# Patient Record
Sex: FEMALE | Race: WHITE | Employment: OTHER | URBAN - METROPOLITAN AREA
[De-identification: names, ages, dates, MRNs, and addresses within clinical notes are randomized per-mention and may not be internally consistent; named-entity substitution may affect disease eponyms.]

---

## 2017-01-19 DIAGNOSIS — N39.3 URINARY, INCONTINENCE, STRESS FEMALE: ICD-10-CM

## 2017-01-19 DIAGNOSIS — E87.6 HYPOKALEMIA: Primary | ICD-10-CM

## 2017-01-19 NOTE — TELEPHONE ENCOUNTER
Potassium Chloride SA      Last Written Prescription Date: 04/09/2015  Last Fill Quantity: 270, # refills: 4  Last Office Visit with [facility], [facility] or [facility] prescribing provider: 10/17/2016       POTASSIUM   Date Value Ref Range Status   02/23/2015 4.5 3.4 - 5.3 mmol/L Final     CREATININE   Date Value Ref Range Status   02/23/2015 0.98 0.52 - 1.04 mg/dL Final     BP Readings from Last 3 Encounters:   10/17/16 118/68   04/01/16 134/63   03/29/16 142/76     Oxybutynin 10mg      Last Written Prescription Date: 02/09/2015  Last Fill Quantity: 90,  # refills: 3  Last Office Visit with [facility], [facility] or [facility] prescribing provider: 10/17/2016                                               Jaquelin Gaines MA

## 2017-01-24 RX ORDER — OXYBUTYNIN CHLORIDE 10 MG/1
10 TABLET, EXTENDED RELEASE ORAL DAILY
Qty: 90 TABLET | Refills: 4 | Status: SHIPPED | OUTPATIENT
Start: 2017-01-24 | End: 2019-01-01

## 2017-01-24 RX ORDER — POTASSIUM CHLORIDE 1500 MG/1
60 TABLET, EXTENDED RELEASE ORAL DAILY
Qty: 270 TABLET | Refills: 4 | Status: SHIPPED | OUTPATIENT
Start: 2017-01-24 | End: 2019-01-01

## 2017-01-24 NOTE — TELEPHONE ENCOUNTER
Routing refill request to provider for review/approval because:  Oxybutynin- was prescribed by urology-will pcp fill this?  Potassium- patient due for labs next month    Page Lewis RN

## 2017-01-30 DIAGNOSIS — R19.7 DIARRHEA, UNSPECIFIED TYPE: Primary | ICD-10-CM

## 2017-01-30 NOTE — TELEPHONE ENCOUNTER
loperamide (IMODIUM A-D) 2 MG tablet      Last Written Prescription Date: 11/9/2016  Last Fill Quantity: 30,  # refills: 0   Last Office Visit with [facility], [facility] or [facility] prescribing provider: 10/17/2016

## 2017-02-02 DIAGNOSIS — R60.9 EDEMA, UNSPECIFIED TYPE: Primary | ICD-10-CM

## 2017-02-02 RX ORDER — LOPERAMIDE HYDROCHLORIDE 2 MG/1
TABLET ORAL
Qty: 30 TABLET | Refills: 0 | Status: SHIPPED | OUTPATIENT
Start: 2017-02-02 | End: 2019-01-01

## 2017-02-02 NOTE — TELEPHONE ENCOUNTER
Routing refill request to provider for review/approval because:  Marcela given x1 and patient did not follow up, please advise  Patient needs to be seen because it has been more than 1 year since last office visit.    David BEE RN, BSN

## 2017-02-07 NOTE — TELEPHONE ENCOUNTER
Furosemide 40mg      Last Written Prescription Date: 01/12/17  Last Fill Quantity: 30, # refills: 0  Last Office Visit with [facility], [facility] or [facility] prescribing provider: 4/9/15       POTASSIUM   Date Value Ref Range Status   02/23/2015 4.5 3.4 - 5.3 mmol/L Final     CREATININE   Date Value Ref Range Status   02/23/2015 0.98 0.52 - 1.04 mg/dL Final     BP Readings from Last 3 Encounters:   10/17/16 118/68   04/01/16 134/63   03/29/16 142/76

## 2017-02-09 RX ORDER — FUROSEMIDE 40 MG
40 TABLET ORAL DAILY
Qty: 90 TABLET | Refills: 0 | Status: SHIPPED | OUTPATIENT
Start: 2017-02-09 | End: 2019-01-01

## 2018-01-01 ENCOUNTER — RECORDS - HEALTHEAST (OUTPATIENT)
Dept: LAB | Facility: CLINIC | Age: 83
End: 2018-01-01

## 2018-01-01 LAB
CHOLEST SERPL-MCNC: 160 MG/DL
FASTING STATUS PATIENT QL REPORTED: YES
HDLC SERPL-MCNC: 42 MG/DL
LDLC SERPL CALC-MCNC: 97 MG/DL
TRIGL SERPL-MCNC: 104 MG/DL

## 2018-01-29 ENCOUNTER — RECORDS - HEALTHEAST (OUTPATIENT)
Dept: LAB | Facility: CLINIC | Age: 83
End: 2018-01-29

## 2018-01-29 LAB
CHOLEST SERPL-MCNC: 177 MG/DL
FASTING STATUS PATIENT QL REPORTED: ABNORMAL
HDLC SERPL-MCNC: 42 MG/DL
LDLC SERPL CALC-MCNC: 116 MG/DL
TRIGL SERPL-MCNC: 97 MG/DL

## 2018-06-26 ENCOUNTER — RECORDS - HEALTHEAST (OUTPATIENT)
Dept: LAB | Facility: CLINIC | Age: 83
End: 2018-06-26

## 2018-06-26 LAB
ANION GAP SERPL CALCULATED.3IONS-SCNC: 12 MMOL/L (ref 5–18)
BUN SERPL-MCNC: 19 MG/DL (ref 8–28)
CALCIUM SERPL-MCNC: 8.7 MG/DL (ref 8.5–10.5)
CHLORIDE BLD-SCNC: 109 MMOL/L (ref 98–107)
CO2 SERPL-SCNC: 20 MMOL/L (ref 22–31)
CREAT SERPL-MCNC: 0.75 MG/DL (ref 0.6–1.1)
ERYTHROCYTE [DISTWIDTH] IN BLOOD BY AUTOMATED COUNT: 13.9 % (ref 11–14.5)
GFR SERPL CREATININE-BSD FRML MDRD: >60 ML/MIN/1.73M2
GLUCOSE BLD-MCNC: 78 MG/DL (ref 70–125)
HBA1C MFR BLD: 4.9 % (ref 4.2–6.1)
HCT VFR BLD AUTO: 33.4 % (ref 35–47)
HGB BLD-MCNC: 10.4 G/DL (ref 12–16)
MCH RBC QN AUTO: 29.5 PG (ref 27–34)
MCHC RBC AUTO-ENTMCNC: 31.1 G/DL (ref 32–36)
MCV RBC AUTO: 95 FL (ref 80–100)
PLATELET # BLD AUTO: 387 THOU/UL (ref 140–440)
PMV BLD AUTO: 9.6 FL (ref 8.5–12.5)
POTASSIUM BLD-SCNC: 4.6 MMOL/L (ref 3.5–5)
RBC # BLD AUTO: 3.52 MILL/UL (ref 3.8–5.4)
SODIUM SERPL-SCNC: 141 MMOL/L (ref 136–145)
TSH SERPL DL<=0.005 MIU/L-ACNC: 2.7 UIU/ML (ref 0.3–5)
WBC: 6.1 THOU/UL (ref 4–11)

## 2019-01-01 ENCOUNTER — RECORDS - HEALTHEAST (OUTPATIENT)
Dept: LAB | Facility: CLINIC | Age: 84
End: 2019-01-01

## 2019-01-01 ENCOUNTER — TRANSFERRED RECORDS (OUTPATIENT)
Dept: HEALTH INFORMATION MANAGEMENT | Facility: CLINIC | Age: 84
End: 2019-01-01

## 2019-01-01 ENCOUNTER — NURSING HOME VISIT (OUTPATIENT)
Dept: GERIATRICS | Facility: CLINIC | Age: 84
End: 2019-01-01
Payer: COMMERCIAL

## 2019-01-01 VITALS
DIASTOLIC BLOOD PRESSURE: 76 MMHG | HEIGHT: 64 IN | SYSTOLIC BLOOD PRESSURE: 128 MMHG | WEIGHT: 165.7 LBS | HEART RATE: 76 BPM | TEMPERATURE: 98.4 F | BODY MASS INDEX: 28.29 KG/M2 | OXYGEN SATURATION: 96 % | RESPIRATION RATE: 18 BRPM

## 2019-01-01 VITALS
WEIGHT: 160.6 LBS | RESPIRATION RATE: 17 BRPM | TEMPERATURE: 97.8 F | BODY MASS INDEX: 27.57 KG/M2 | SYSTOLIC BLOOD PRESSURE: 106 MMHG | DIASTOLIC BLOOD PRESSURE: 66 MMHG | OXYGEN SATURATION: 95 % | HEART RATE: 76 BPM

## 2019-01-01 VITALS
HEART RATE: 70 BPM | BODY MASS INDEX: 27.3 KG/M2 | RESPIRATION RATE: 18 BRPM | WEIGHT: 159.9 LBS | HEIGHT: 64 IN | OXYGEN SATURATION: 96 % | DIASTOLIC BLOOD PRESSURE: 70 MMHG | SYSTOLIC BLOOD PRESSURE: 135 MMHG | TEMPERATURE: 98.6 F

## 2019-01-01 VITALS
HEIGHT: 64 IN | OXYGEN SATURATION: 93 % | BODY MASS INDEX: 28 KG/M2 | DIASTOLIC BLOOD PRESSURE: 69 MMHG | RESPIRATION RATE: 19 BRPM | WEIGHT: 164 LBS | HEART RATE: 82 BPM | TEMPERATURE: 97.2 F | SYSTOLIC BLOOD PRESSURE: 112 MMHG

## 2019-01-01 VITALS
OXYGEN SATURATION: 96 % | SYSTOLIC BLOOD PRESSURE: 127 MMHG | RESPIRATION RATE: 18 BRPM | DIASTOLIC BLOOD PRESSURE: 71 MMHG | BODY MASS INDEX: 26.56 KG/M2 | TEMPERATURE: 97.5 F | HEIGHT: 64 IN | WEIGHT: 155.6 LBS | HEART RATE: 69 BPM

## 2019-01-01 VITALS
HEART RATE: 72 BPM | HEIGHT: 64 IN | TEMPERATURE: 97.5 F | DIASTOLIC BLOOD PRESSURE: 78 MMHG | BODY MASS INDEX: 28.17 KG/M2 | OXYGEN SATURATION: 97 % | WEIGHT: 165 LBS | SYSTOLIC BLOOD PRESSURE: 129 MMHG | RESPIRATION RATE: 17 BRPM

## 2019-01-01 DIAGNOSIS — G89.29 OTHER CHRONIC PAIN: ICD-10-CM

## 2019-01-01 DIAGNOSIS — F03.90 DEMENTIA WITHOUT BEHAVIORAL DISTURBANCE, UNSPECIFIED DEMENTIA TYPE: Primary | ICD-10-CM

## 2019-01-01 DIAGNOSIS — R13.10 DYSPHAGIA, UNSPECIFIED TYPE: ICD-10-CM

## 2019-01-01 DIAGNOSIS — G30.8 ALZHEIMER'S DISEASE OF OTHER ONSET WITHOUT BEHAVIORAL DISTURBANCE: Primary | ICD-10-CM

## 2019-01-01 DIAGNOSIS — I48.0 PAROXYSMAL ATRIAL FIBRILLATION (H): ICD-10-CM

## 2019-01-01 DIAGNOSIS — F03.91 DEMENTIA WITH BEHAVIORAL DISTURBANCE, UNSPECIFIED DEMENTIA TYPE: ICD-10-CM

## 2019-01-01 DIAGNOSIS — F32.9 MAJOR DEPRESSIVE DISORDER, REMISSION STATUS UNSPECIFIED, UNSPECIFIED WHETHER RECURRENT: ICD-10-CM

## 2019-01-01 DIAGNOSIS — F03.91 DEMENTIA WITH BEHAVIORAL DISTURBANCE, UNSPECIFIED DEMENTIA TYPE: Primary | ICD-10-CM

## 2019-01-01 DIAGNOSIS — L89.220 PRESSURE INJURY OF TROCHANTERIC REGION OF LEFT HIP, UNSTAGEABLE (H): ICD-10-CM

## 2019-01-01 DIAGNOSIS — G89.4 CHRONIC PAIN SYNDROME: ICD-10-CM

## 2019-01-01 DIAGNOSIS — R52 PAIN: Primary | ICD-10-CM

## 2019-01-01 DIAGNOSIS — M25.561 PAIN IN BOTH KNEES, UNSPECIFIED CHRONICITY: ICD-10-CM

## 2019-01-01 DIAGNOSIS — I48.91 ATRIAL FIBRILLATION, UNSPECIFIED TYPE (H): ICD-10-CM

## 2019-01-01 DIAGNOSIS — M25.562 PAIN IN BOTH KNEES, UNSPECIFIED CHRONICITY: ICD-10-CM

## 2019-01-01 DIAGNOSIS — F32.1 CURRENT MODERATE EPISODE OF MAJOR DEPRESSIVE DISORDER, UNSPECIFIED WHETHER RECURRENT (H): ICD-10-CM

## 2019-01-01 DIAGNOSIS — F41.8 DEPRESSION WITH ANXIETY: Primary | ICD-10-CM

## 2019-01-01 DIAGNOSIS — G30.8 ALZHEIMER'S DISEASE OF OTHER ONSET WITHOUT BEHAVIORAL DISTURBANCE: ICD-10-CM

## 2019-01-01 DIAGNOSIS — F02.80 ALZHEIMER'S DISEASE OF OTHER ONSET WITHOUT BEHAVIORAL DISTURBANCE: Primary | ICD-10-CM

## 2019-01-01 DIAGNOSIS — R52 PAIN: ICD-10-CM

## 2019-01-01 DIAGNOSIS — F41.8 DEPRESSION WITH ANXIETY: ICD-10-CM

## 2019-01-01 DIAGNOSIS — D64.9 ANEMIA, UNSPECIFIED TYPE: ICD-10-CM

## 2019-01-01 DIAGNOSIS — K59.00 CONSTIPATION, UNSPECIFIED CONSTIPATION TYPE: ICD-10-CM

## 2019-01-01 DIAGNOSIS — Z51.5 HOSPICE CARE PATIENT: ICD-10-CM

## 2019-01-01 DIAGNOSIS — F41.9 ANXIETY DISORDER, UNSPECIFIED TYPE: ICD-10-CM

## 2019-01-01 DIAGNOSIS — M25.50 PAIN IN JOINT, MULTIPLE SITES: ICD-10-CM

## 2019-01-01 DIAGNOSIS — S91.104S: ICD-10-CM

## 2019-01-01 DIAGNOSIS — F32.1 CURRENT MODERATE EPISODE OF MAJOR DEPRESSIVE DISORDER, UNSPECIFIED WHETHER RECURRENT (H): Primary | ICD-10-CM

## 2019-01-01 DIAGNOSIS — I10 BENIGN ESSENTIAL HYPERTENSION: ICD-10-CM

## 2019-01-01 DIAGNOSIS — L89.220 PRESSURE INJURY OF TROCHANTERIC REGION OF LEFT HIP, UNSTAGEABLE (H): Primary | ICD-10-CM

## 2019-01-01 DIAGNOSIS — K59.01 SLOW TRANSIT CONSTIPATION: ICD-10-CM

## 2019-01-01 DIAGNOSIS — F02.80 ALZHEIMER'S DISEASE OF OTHER ONSET WITHOUT BEHAVIORAL DISTURBANCE: ICD-10-CM

## 2019-01-01 LAB
ANION GAP SERPL CALCULATED.3IONS-SCNC: 9 MMOL/L (ref 5–18)
ANION GAP SERPL CALCULATED.3IONS-SCNC: 9 MMOL/L (ref 5–18)
BUN SERPL-MCNC: 13 MG/DL (ref 8–28)
BUN SERPL-MCNC: 13 MG/DL (ref 8–28)
CALCIUM SERPL-MCNC: 8.7 MG/DL (ref 8.5–10.5)
CALCIUM SERPL-MCNC: 8.7 MG/DL (ref 8.5–10.5)
CHLORIDE BLD-SCNC: 110 MMOL/L (ref 98–107)
CHLORIDE SERPLBLD-SCNC: 110 MMOL/L (ref 98–107)
CHOLEST SERPL-MCNC: 191 MG/DL
CHOLEST SERPL-MCNC: 191 MG/DL
CO2 SERPL-SCNC: 23 MMOL/L (ref 22–31)
CO2 SERPL-SCNC: 23 MMOL/L (ref 22–31)
CREAT SERPL-MCNC: 0.7 MG/DL (ref 0.6–1.1)
CREAT SERPL-MCNC: 0.7 MG/DL (ref 0.6–1.1)
ERYTHROCYTE [DISTWIDTH] IN BLOOD BY AUTOMATED COUNT: 13.9 % (ref 11–14.5)
ERYTHROCYTE [DISTWIDTH] IN BLOOD BY AUTOMATED COUNT: 13.9 % (ref 11–14.5)
FASTING STATUS PATIENT QL REPORTED: ABNORMAL
GFR SERPL CREATININE-BSD FRML MDRD: >60 ML/MIN/1.73M2
GFR SERPL CREATININE-BSD FRML MDRD: >60 ML/MIN/1.73M2
GLUCOSE BLD-MCNC: 96 MG/DL (ref 70–125)
GLUCOSE SERPL-MCNC: 96 MG/DL (ref 70–125)
HCT VFR BLD AUTO: 34.4 % (ref 35–47)
HCT VFR BLD AUTO: 34.4 % (ref 35–47)
HDLC SERPL-MCNC: 44 MG/DL
HDLC SERPL-MCNC: 44 MG/DL
HEMOGLOBIN: 10.7 G/DL (ref 12–16)
HGB BLD-MCNC: 10.7 G/DL (ref 12–16)
LDLC SERPL CALC-MCNC: 125 MG/DL
LDLC SERPL CALC-MCNC: 125 MG/DL
MCH RBC QN AUTO: 29.1 PG (ref 27–34)
MCH RBC QN AUTO: 29.1 PG (ref 27–34)
MCHC RBC AUTO-ENTMCNC: 31.1 G/DL (ref 32–36)
MCHC RBC AUTO-ENTMCNC: 31.1 G/DL (ref 32–36)
MCV RBC AUTO: 94 FL (ref 80–100)
MCV RBC AUTO: 94 FL (ref 80–100)
PLATELET # BLD AUTO: 332 THOU/UL (ref 140–440)
PLATELET # BLD AUTO: 332 THOU/UL (ref 140–440)
PMV BLD AUTO: 10 FL (ref 8.5–12.5)
POTASSIUM BLD-SCNC: 4.3 MMOL/L (ref 3.5–5)
POTASSIUM SERPL-SCNC: 4.3 MMOL/L (ref 3.5–5)
RBC # BLD AUTO: 3.68 MILL/UL (ref 3.8–5.4)
RBC # BLD AUTO: 3.68 MILL/UL (ref 3.8–5.4)
SODIUM SERPL-SCNC: 142 MMOL/L (ref 136–145)
SODIUM SERPL-SCNC: 142 MMOL/L (ref 136–145)
TRIGL SERPL-MCNC: 109 MG/DL
TRIGL SERPL-MCNC: 109 MG/DL
WBC # BLD AUTO: 6.7 THOU/UL (ref 4–11)
WBC: 6.7 THOU/UL (ref 4–11)

## 2019-01-01 PROCEDURE — 99309 SBSQ NF CARE MODERATE MDM 30: CPT | Performed by: NURSE PRACTITIONER

## 2019-01-01 PROCEDURE — 99309 SBSQ NF CARE MODERATE MDM 30: CPT | Performed by: INTERNAL MEDICINE

## 2019-01-01 PROCEDURE — 99310 SBSQ NF CARE HIGH MDM 45: CPT | Performed by: NURSE PRACTITIONER

## 2019-01-01 PROCEDURE — 99318 ZZC ANNUAL NURSING FAC ASSESSMNT, STABLE: CPT | Mod: GV | Performed by: NURSE PRACTITIONER

## 2019-01-01 PROCEDURE — 99305 1ST NF CARE MODERATE MDM 35: CPT | Performed by: INTERNAL MEDICINE

## 2019-01-01 RX ORDER — GUAIFENESIN 200 MG/1
400 TABLET ORAL EVERY 4 HOURS PRN
COMMUNITY

## 2019-01-01 RX ORDER — NYSTATIN 100000 [USP'U]/G
POWDER TOPICAL 2 TIMES DAILY
COMMUNITY

## 2019-01-01 RX ORDER — HYDROCODONE BITARTRATE AND ACETAMINOPHEN 5; 325 MG/1; MG/1
1 TABLET ORAL EVERY 6 HOURS
Qty: 3 TABLET | Refills: 0 | Status: SHIPPED | OUTPATIENT
Start: 2019-01-01

## 2019-01-01 RX ORDER — HYDROCODONE BITARTRATE AND ACETAMINOPHEN 5; 325 MG/1; MG/1
1 TABLET ORAL EVERY 6 HOURS
Qty: 3 TABLET | Refills: 0 | Status: SHIPPED | OUTPATIENT
Start: 2019-01-01 | End: 2019-01-01

## 2019-01-01 RX ORDER — HYDROCODONE BITARTRATE AND ACETAMINOPHEN 5; 325 MG/1; MG/1
1 TABLET ORAL EVERY 6 HOURS PRN
COMMUNITY

## 2019-01-01 RX ORDER — ZINC GLUCONATE 50 MG
50 TABLET ORAL DAILY
COMMUNITY

## 2019-01-01 RX ORDER — SERTRALINE HYDROCHLORIDE 25 MG/1
50 TABLET, FILM COATED ORAL DAILY
Qty: 30 TABLET | Refills: 11 | Status: SHIPPED | OUTPATIENT
Start: 2019-01-01

## 2019-01-01 RX ORDER — PAROXETINE 40 MG/1
40 TABLET, FILM COATED ORAL DAILY
COMMUNITY
End: 2019-01-01 | Stop reason: ALTCHOICE

## 2019-01-01 RX ORDER — SERTRALINE HYDROCHLORIDE 25 MG/1
25 TABLET, FILM COATED ORAL DAILY
Qty: 30 TABLET | Refills: 11
Start: 2019-01-01 | End: 2019-01-01

## 2019-01-01 RX ORDER — HYDROCODONE BITARTRATE AND ACETAMINOPHEN 5; 325 MG/1; MG/1
1 TABLET ORAL EVERY 6 HOURS
COMMUNITY
End: 2019-01-01

## 2019-01-01 RX ORDER — ACETAMINOPHEN 500 MG
1000 TABLET ORAL EVERY 12 HOURS PRN
COMMUNITY
End: 2019-01-01

## 2019-01-01 RX ORDER — AMOXICILLIN 250 MG
2 CAPSULE ORAL AT BEDTIME
COMMUNITY

## 2019-01-01 RX ORDER — QUETIAPINE FUMARATE 25 MG/1
25 TABLET, FILM COATED ORAL 3 TIMES DAILY
COMMUNITY

## 2019-01-01 RX ORDER — ACETAMINOPHEN 325 MG/1
325 TABLET ORAL 2 TIMES DAILY PRN
COMMUNITY

## 2019-01-01 ASSESSMENT — MIFFLIN-ST. JEOR
SCORE: 1175.3
SCORE: 1193.9
SCORE: 1198.44
SCORE: 1155.8
SCORE: 1201.61

## 2019-04-02 NOTE — PROGRESS NOTES
[place] [facility]  PRIMARY CARE PROVIDER AND CLINIC:  BETTY FRAGOSO MD, None  Chief Complaint   Patient presents with     Establish Care     [facility] Medical Record Number:  [id number]  Place of Service where encounter took place:  [facility] [[id number]]    Xiomara Matson  is a 83 year old  (1935), living in above facility since 2017 and now choosing to change PCPs to [facility]. .  Admitted to this facility for  medical management and nursing care.    HPI:    HPI information obtained from: facility chart records, facility staff, patient report and [facility] chart review.     1. Alzheimer's disease of other onset without behavioral disturbance    2. Major depressive disorder, remission status unspecified, unspecified whether recurrent    3. 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 Open wound of lesser toe of right foot without damage to nail, sequela    5. Pain in both knees, unspecified chronicity    6. Other chronic pain    7. Dysphagia, unspecified type    8. Anemia, unspecified type    9. Atrial fibrillation, unspecified type (H)    10. Constipation, unspecified constipation type      Patient with above Dx/Hx, previously followed by [facility] team of Sonam Ravi NP and , now changed over to [facility] team, seen today in room with facility nurse present to assist, behaviors include intermittent yelling out and crying when left alone, contracted legs, L hip wound, R foot 4th toe wound, is up for meals, prefers to spend day lying in bed, word finding difficulties, pain with movement or transfer, overall fairly pleasant today, no acute concerns, stable, progressive age-related cognitive and physical decline.    CODE STATUS/ADVANCE DIRECTIVES DISCUSSION:   DNR / DNI  Patient's living condition: lives in a skilled nursing facility  ALLERGIES: Ibuprofen sodium; Hmg-coa-r inhibitors; Sulfa drugs; and Oxycodone  PAST MEDICAL  HISTORY:  has a past medical history of Adrenal mass, right (H) (3/23/2015), Alzheimer disease (8/2015), Cholelithiasis (3/23/2015), Female incontinence, Fibroid, Hypercholesterolaemia, Iritis, Major depression in complete remission (H), and Tobacco use disorder (2/23/2015).  PAST SURGICAL HISTORY:   has a past surgical history that includes knee surgery (Left, 12/31/2002); Extracapsular cataract extration with intraocular lens implant (Bilateral); Dilation and curettage (1/2011); Tonsillectomy; Dental surgery; and ROMAINE/LSO/TVT (11/2011).  FAMILY HISTORY: family history includes Alzheimer Disease in her mother; Breast Cancer in her sister; Cancer (age of onset: 40) in her son.  SOCIAL HISTORY:   reports that she quit smoking about 3 years ago. Her smoking use included cigarettes. She has a 8.75 pack-year smoking history. she has never used smokeless tobacco. She reports that she drinks alcohol. She reports that she does not use drugs.    Post Discharge Medication Reconciliation Status: discharge medications reconciled and changed, per note/orders (see AVS)    Current Outpatient Medications   Medication Sig Dispense Refill     acetaminophen (TYLENOL) 500 MG tablet Take 1,000 mg by mouth 2 times daily as needed for mild pain       diclofenac (VOLTAREN) 1 % topical gel Place 4 g onto the skin 3 times daily Also prn       guaiFENesin (ORGANIDIN) 200 MG tablet Take 400 mg by mouth every 4 hours as needed for cough       HYDROcodone-acetaminophen (NORCO) 5-325 MG tablet Take 1 tablet by mouth every 6 hours       nystatin (MYCOSTATIN) 989763 UNIT/GM external powder Apply topically 2 times daily Also bid prn       PARoxetine (PAXIL) 40 MG tablet Take 40 mg by mouth daily       QUEtiapine (SEROQUEL) 25 MG tablet Take 12.5 mg by mouth 2 times daily       senna-docusate (SENOKOT-S/PERICOLACE) 8.6-50 MG tablet Take 2 tablets by mouth At Bedtime       zinc gluconate 50 MG tablet Take 50 mg by mouth daily    "      ROS:  Unobtainable secondary to cognitive impairment.     Vitals:  /78   Pulse 72   Temp 97.5  F (36.4  C)   Resp 17   Ht 1.626 m (5' 4\")   Wt 74.8 kg (165 lb)   SpO2 97%   BMI 28.32 kg/m    Exam:  GENERAL APPEARANCE:  in no distress, appears healthy  ENT:  Mouth and posterior oropharynx normal, moist mucous membranes, normal hearing acuity  RESP:  lungs clear to auscultation , no respiratory distress  CV:  regular rate and rhythm, no murmur, rub, or gallop, no edema  ABDOMEN:  no guarding or rebound, bowel sounds normal  M/S:   Gait and station abnormal requires assist, WC mobility  SKIN:  Inspection of skin and subcutaneous tissueper HPI  NEURO:   Examination of sensation by touch normal  PSYCH:  oriented to self, affect and mood normal    Lab/Diagnostic data:  no Current labs available today    ASSESSMENT/PLAN:     Alzheimer's disease of other onset without behavioral disturbance  Major depressive disorder, remission status unspecified, unspecified whether recurrent  -has intermittent outbursts and crying, generally when left alone and wants interaction  -continue seroquel 12.5mg BID  -continue paxil 40mg day, was increased in April 2018  -discontinue depakote 125mg dailiy; was already reduced on 2/4/19 from BID to daily, would prefer to utilize one psych med instead (seroquel)  -plan to adjust seroquel PRN as indicated  -would prefer to utilize other antidepressant other than paxil, will follow patient and consider in future  -CBC/BMP on Mon 4/8 to obtain recent values    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, unstageable (H)  Open wound of lesser toe of right foot without damage to nail, sequela  -being followed by [facility] for past years  -has group 2 mattress, heel protectors, leg wedge  -L hip 0.6x0.6x0.8x0.8cm with undermining, grey slough, serous/purulent drainage  -R foot 4th lateral toe open wound 0.2cm diameter 0.2cm deep, macerated " tissue, stage 3  -staff to follow [facility] instructions  -will consider discontinue of [facility] services and have in-facility WOSUSANNE Harrison follow in future      Pain in both knees, unspecified chronicity  Other chronic pain  -complaints of pain particularly with repositioning and transferring  -previously on tramadol, changed to norco in January  -will consider changing norco to APAP 1000mg TID scheduled along with oxycodone PRN for improved pain relief    Dysphagia, unspecified type  -able to eat well, appetite appropriate  -continue NDD3 diet with thin liquids  -if having issues refer to ST    Anemia, unspecified type  -no recent Hgb on file  -no s/s anemia  -Hgb on 4/8 to follow up    Atrial fibrillation, unspecified type (H)  -historical, unsure why is not on anticoagulant  -no reason to change plan; continue without anticoag for now, may consult with family in future regarding    Constipation, unspecified constipation type  -patient having no s/s constipation  -continue senokot as prescribed  -facility to follow outputs       Total time spent with patient visit at the skilled nursing facility was 45 min including patient visit, review of past records and caregiver review. Greater than 50% of total time spent with counseling and coordinating care due to acute on chronic health concerns requiring complex decision making and planning.     Electronically signed by:  EDMUND Luz CNP

## 2019-04-03 PROBLEM — F41.9 ANXIETY DISORDER, UNSPECIFIED TYPE: Status: ACTIVE | Noted: 2019-01-01

## 2019-04-03 PROBLEM — R13.10 DYSPHAGIA, UNSPECIFIED TYPE: Status: ACTIVE | Noted: 2019-01-01

## 2019-04-03 PROBLEM — N39.3 FEMALE STRESS INCONTINENCE: Status: ACTIVE | Noted: 2019-01-01

## 2019-04-03 PROBLEM — K59.00 CONSTIPATION, UNSPECIFIED CONSTIPATION TYPE: Status: ACTIVE | Noted: 2019-01-01

## 2019-04-03 PROBLEM — L89.220: Status: ACTIVE | Noted: 2019-01-01

## 2019-04-03 PROBLEM — L89.609: Status: ACTIVE | Noted: 2019-01-01

## 2019-04-03 PROBLEM — D64.9 ANEMIA, UNSPECIFIED TYPE: Status: ACTIVE | Noted: 2019-01-01

## 2019-04-03 PROBLEM — I48.91 ATRIAL FIBRILLATION, UNSPECIFIED TYPE (H): Status: ACTIVE | Noted: 2019-01-01

## 2019-04-03 PROBLEM — G89.29 OTHER CHRONIC PAIN: Status: ACTIVE | Noted: 2019-01-01

## 2019-04-22 NOTE — LETTER
4/22/2019        RE: Xiomara Matson  [address] [facility] [address]        [place] [facility]  INITIAL VISIT NOTE  April 22, 2019    PRIMARY CARE PROVIDER AND CLINIC:  Artemio Jaramillo [address], [address] / [facility] [address]    Chief Complaint   Patient presents with     Establish Care       HPI:    Xiomara Matson is a 83 year old  (1935) female who was seen at [facility] on April 22, 2019 for an initial visit. Medical history is notable for atrial fibrillation and dementia. She has resided at this facility since 2/13/17 and was followed by a different medical team until recently, when she transferred to the [facility] in house team.     Today, Ms. Matson is seen in her room. She was sound asleep mid morning. Did not respond to me calling her name. Talked with nursing and no concerns today. She does have episodes of calling out, usually when alone. Last GDR of quetiapine was in March 2018, paroxetine was increased in April 2018 and since we assumed care depakote was discontinued (had already been reduced to 125 mg daily)    CODE STATUS:   DNR / DNI    ALLERGIES:     Allergies   Allergen Reactions     Ibuprofen Sodium Swelling     Hmg-Coa-R Inhibitors      Sulfa Drugs      Oxycodone Other (See Comments)     hallucinations       PAST MEDICAL HISTORY:   Past Medical History:   Diagnosis Date     Adrenal mass, right (H) 3/23/2015     Alzheimer disease 8/2015     Cholelithiasis 3/23/2015     Female incontinence      Fibroid      Hypercholesterolaemia      Iritis      Major depression in complete remission (H)     Last visit to Psychiatrist in 2005     Tobacco use disorder 2/23/2015       PAST SURGICAL HISTORY:   Past Surgical History:   Procedure Laterality Date     DENTAL SURGERY      patient has dentures     DILATION AND CURETTAGE  1/2011    endometrial polyp seen     EXTRACAPSULAR CATARACT EXTRATION WITH INTRAOCULAR LENS IMPLANT Bilateral      KNEE SURGERY Left  "12/31/2002     ROMAINE/LSO/TVT  11/2011    postmenopausal bleeding, urinary incontinence     TONSILLECTOMY         FAMILY HISTORY:   Family History   Problem Relation Age of Onset     Alzheimer Disease Mother      Breast Cancer Sister      Cancer Son 40        Lymphoma       SOCIAL HISTORY:   Lives in SNF    MEDICATIONS:  Current Outpatient Medications   Medication Sig Dispense Refill     acetaminophen (TYLENOL) 500 MG tablet Take 1,000 mg by mouth every 12 hours as needed for mild pain        diclofenac (VOLTAREN) 1 % topical gel Place 4 g onto the skin 3 times daily Also BID PRN       guaiFENesin (ORGANIDIN) 200 MG tablet Take 400 mg by mouth every 4 hours as needed for cough       HYDROcodone-acetaminophen (NORCO) 5-325 MG tablet Take 1 tablet by mouth every 6 hours       nystatin (MYCOSTATIN) 917114 UNIT/GM external powder Apply topically 2 times daily Also TID PRN       PARoxetine (PAXIL) 40 MG tablet Take 40 mg by mouth daily       QUEtiapine (SEROQUEL) 25 MG tablet Take 12.5 mg by mouth 2 times daily       senna-docusate (SENOKOT-S/PERICOLACE) 8.6-50 MG tablet Take 2 tablets by mouth At Bedtime       zinc gluconate 50 MG tablet Take 50 mg by mouth daily         ROS:  Unable to obtain due to cognitive impairment or aphasia    PHYSICAL EXAM:  /76   Pulse 76   Temp 98.4  F (36.9  C)   Resp 18   Ht 1.626 m (5' 4\")   Wt 75.2 kg (165 lb 11.2 oz)   SpO2 96%   BMI 28.44 kg/m      Gen: laying in bed, sound asleep and in no acute distress  HEENT: normocephalic  Resp: breathing non labored  MSK: decreased muscle tone, no LE edema  Neuro: unable to assess as she was sound asleep and didn't wake to me calling her name  Psych: unable to assess as she was sound asleep and didn't wake to me calling her name    LABORATORY/IMAGING DATA:  Reviewed as per Epic    ASSESSMENT/PLAN:    Dementia WithBehavioral Disturbance  Depression / Anxiety   No recent BIMS as unable to complete. Has intermittent episodes of crying out. " I believe last GDR of quetiapine was in Match 2018 and paroxetine was increased in April 2018. Now off of depakote as of 4/3/19.   -- continues on quetiapine 12.5 mg BID and paroxetine 40 mg daily   -- no adjustments today as new to this medical team  -- ongoing 24/7 nursing and supportive cares    Dysphagia  In setting of above  -- continues on NDD3 with thin liquids    Paroxysmal Atrial Fibrillation  HR 60s-70s. Not on any rate control meds or anticoagulation,   -- follow clinically    Left Hip Wound and R Foot Wound  Follows with [facility]   -- continue wound care orders as written     Chronic Pain Syndrome  OA of knees. Wounds as above  -- continues on APAP 1000 mg q12h PRN, diclofenac gel TID and BID PRN and Norco 5-325 mg 1 tab q6h     Slow Transit Constipation  -- continues on Senna S 2 tabs at bedtime   -- adjust bowel regimen as needed      Electronically signed by:  Prudence Eduardo MD                Sincerely,        Prudence Eduardo MD

## 2019-04-22 NOTE — PROGRESS NOTES
[place] [facility]  INITIAL VISIT NOTE  April 22, 2019    PRIMARY CARE PROVIDER AND CLINIC:  Artemio Jaramillo [address], [address] / [facility] [address]    Chief Complaint   Patient presents with     Establish Care       HPI:    Xiomara Matson is a 83 year old  (1935) female who was seen at [facility] on April 22, 2019 for an initial visit. Medical history is notable for atrial fibrillation and dementia. She has resided at this facility since 2/13/17 and was followed by a different medical team until recently, when she transferred to the [facility] in house team.     Today, Ms. Matson is seen in her room. She was sound asleep mid morning. Did not respond to me calling her name. Talked with nursing and no concerns today. She does have episodes of calling out, usually when alone. Last GDR of quetiapine was in March 2018, paroxetine was increased in April 2018 and since we assumed care depakote was discontinued (had already been reduced to 125 mg daily)    CODE STATUS:   DNR / DNI    ALLERGIES:     Allergies   Allergen Reactions     Ibuprofen Sodium Swelling     Hmg-Coa-R Inhibitors      Sulfa Drugs      Oxycodone Other (See Comments)     hallucinations       PAST MEDICAL HISTORY:   Past Medical History:   Diagnosis Date     Adrenal mass, right (H) 3/23/2015     Alzheimer disease 8/2015     Cholelithiasis 3/23/2015     Female incontinence      Fibroid      Hypercholesterolaemia      Iritis      Major depression in complete remission (H)     Last visit to Psychiatrist in 2005     Tobacco use disorder 2/23/2015       PAST SURGICAL HISTORY:   Past Surgical History:   Procedure Laterality Date     DENTAL SURGERY      patient has dentures     DILATION AND CURETTAGE  1/2011    endometrial polyp seen     EXTRACAPSULAR CATARACT EXTRATION WITH INTRAOCULAR LENS IMPLANT Bilateral      KNEE SURGERY Left 12/31/2002     ROMAINE/LSO/TVT  11/2011    postmenopausal bleeding, urinary incontinence      "TONSILLECTOMY         FAMILY HISTORY:   Family History   Problem Relation Age of Onset     Alzheimer Disease Mother      Breast Cancer Sister      Cancer Son 40        Lymphoma       SOCIAL HISTORY:   Lives in SNF    MEDICATIONS:  Current Outpatient Medications   Medication Sig Dispense Refill     acetaminophen (TYLENOL) 500 MG tablet Take 1,000 mg by mouth every 12 hours as needed for mild pain        diclofenac (VOLTAREN) 1 % topical gel Place 4 g onto the skin 3 times daily Also BID PRN       guaiFENesin (ORGANIDIN) 200 MG tablet Take 400 mg by mouth every 4 hours as needed for cough       HYDROcodone-acetaminophen (NORCO) 5-325 MG tablet Take 1 tablet by mouth every 6 hours       nystatin (MYCOSTATIN) 409529 UNIT/GM external powder Apply topically 2 times daily Also TID PRN       PARoxetine (PAXIL) 40 MG tablet Take 40 mg by mouth daily       QUEtiapine (SEROQUEL) 25 MG tablet Take 12.5 mg by mouth 2 times daily       senna-docusate (SENOKOT-S/PERICOLACE) 8.6-50 MG tablet Take 2 tablets by mouth At Bedtime       zinc gluconate 50 MG tablet Take 50 mg by mouth daily         ROS:  Unable to obtain due to cognitive impairment or aphasia    PHYSICAL EXAM:  /76   Pulse 76   Temp 98.4  F (36.9  C)   Resp 18   Ht 1.626 m (5' 4\")   Wt 75.2 kg (165 lb 11.2 oz)   SpO2 96%   BMI 28.44 kg/m     Gen: laying in bed, sound asleep and in no acute distress  HEENT: normocephalic  Resp: breathing non labored  MSK: decreased muscle tone, no LE edema  Neuro: unable to assess as she was sound asleep and didn't wake to me calling her name  Psych: unable to assess as she was sound asleep and didn't wake to me calling her name    LABORATORY/IMAGING DATA:  Reviewed as per Epic    ASSESSMENT/PLAN:    Dementia WithBehavioral Disturbance  Depression / Anxiety   No recent BIMS as unable to complete. Has intermittent episodes of crying out. I believe last GDR of quetiapine was in Match 2018 and paroxetine was increased in April " 2018. Now off of depakote as of 4/3/19.   -- continues on quetiapine 12.5 mg BID and paroxetine 40 mg daily   -- no adjustments today as new to this medical team  -- ongoing 24/7 nursing and supportive cares    Dysphagia  In setting of above  -- continues on NDD3 with thin liquids    Paroxysmal Atrial Fibrillation  HR 60s-70s. Not on any rate control meds or anticoagulation,   -- follow clinically    Left Hip Wound and R Foot Wound  Follows with [facility]   -- continue wound care orders as written     Chronic Pain Syndrome  OA of knees. Wounds as above  -- continues on APAP 1000 mg q12h PRN, diclofenac gel TID and BID PRN and Norco 5-325 mg 1 tab q6h     Slow Transit Constipation  -- continues on Senna S 2 tabs at bedtime   -- adjust bowel regimen as needed      Electronically signed by:  Prudence Eduardo MD

## 2019-05-15 NOTE — PROGRESS NOTES
[place] [facility]  Chief Complaint   Patient presents with     FPC Regulatory     [place] Medical Record Number:  [id number]  Place of Service where encounter took place:  [facility] ([facility]) [[id number]]    HPI:    Xiomara Matson  is 83 year old (1935), who is being seen today for a federally mandated E/M visit.  HPI information obtained from: facility chart records, facility staff, patient report and [facility] Epic chart review. 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, unstageable (H)  Current moderate episode of major depressive disorder, unspecified whether recurrent (H)  Alzheimer's disease of other onset without behavioral disturbance  Dysphagia, unspecified type  Other chronic pain     Patient seen today in room, generally lies in bed most of day and is rocío transfer to  for meals, diet changed to NDD#, L hip and heel areas followed by facility WOCN, no overt s/s depression although staff report she cries at times, will also scream out if wanting assistance with anything, no pain present, moderate cognitive limitations and unable to answer specific questions clearly, overall status is stable in supervised and supportive environment.    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  PAST SURGICAL HISTORY:   has a past surgical history that includes knee surgery (Left, 12/31/2002); Extracapsular cataract extration with intraocular lens implant (Bilateral); Dilation and curettage (1/2011); Tonsillectomy; Dental surgery; and ROMAINE/LSO/TVT (11/2011).  FAMILY HISTORY:  "family history includes Alzheimer Disease in her mother; Breast Cancer in her sister; Cancer (age of onset: 40) in her son.  SOCIAL HISTORY:  reports that she quit smoking about 3 years ago. Her smoking use included cigarettes. She has a 8.75 pack-year smoking history. She has never used smokeless tobacco. She reports that she drinks alcohol. She reports that she does not use drugs.    MEDICATIONS:  Current Outpatient Medications   Medication Sig Dispense Refill     acetaminophen (TYLENOL) 500 MG tablet Take 1,000 mg by mouth every 12 hours as needed for mild pain        diclofenac (VOLTAREN) 1 % topical gel Place 4 g onto the skin 3 times daily Also BID PRN       guaiFENesin (ORGANIDIN) 200 MG tablet Take 400 mg by mouth every 4 hours as needed for cough       HYDROcodone-acetaminophen (NORCO) 5-325 MG tablet Take 1 tablet by mouth every 6 hours 3 tablet 0     nystatin (MYCOSTATIN) 164466 UNIT/GM external powder Apply topically 2 times daily Also TID PRN       QUEtiapine (SEROQUEL) 25 MG tablet Take 12.5 mg by mouth 2 times daily       senna-docusate (SENOKOT-S/PERICOLACE) 8.6-50 MG tablet Take 2 tablets by mouth At Bedtime       sertraline (ZOLOFT) 25 MG tablet Take 1 tablet (25 mg) by mouth daily 30 tablet 11     zinc gluconate 50 MG tablet Take 50 mg by mouth daily         Case Management:  I have reviewed the care plan and MDS and do agree with the plan. Patient's desire to return to the community is present, but is not able due to care needs . Information reviewed:  Medications, vital signs, orders, and nursing notes.    ROS:  Unobtainable secondary to cognitive impairment.     Vitals:  /69   Pulse 82   Temp 97.2  F (36.2  C)   Resp 19   Ht 1.626 m (5' 4\")   Wt 74.4 kg (164 lb)   SpO2 93%   BMI 28.15 kg/m    Body mass index is 28.15 kg/m .  Exam:  GENERAL APPEARANCE:  in no distress, appears healthy  ENT:  Mouth and posterior oropharynx normal, moist mucous membranes  RESP:  lungs clear to " auscultation   CV:  regular rate and rhythm, no murmur, rub, or gallop, no edema  ABDOMEN:  bowel sounds normal  M/S:   Gait and station abnormal hyoer transfer, WC mobility  SKIN:  Inspection of skin and subcutaneous tissueWNL, heels and trochanter followed by [facility]  NEURO:   Examination of sensation by touch normal  PSYCH:  oriented to self, affect and mood normal    Lab/Diagnostic data:   Labs done in SNF are in [place] EPIC. Please refer to them using [facility]/Care Everywhere.    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, unstageable (H)  -L trochanter and bilateral heels at risk for PU's  -L trochanter now 0.5cm diameter, 0.5cm deep, no s/s infection  -already started on zinc and nutritional supplements, heel protectors and air mattress with overlay  -legs and torso are slightly contracted; considering addition of baclofen or cyclobenzaprine to regimen although patient is not in distress  -continue to be followed by LILA weekly    Current moderate episode of major depressive disorder, unspecified whether recurrent (H)  -per staff stil having episodes of crying at times, unsure if depression or progression of alzheimer's  -will wean paxil from 40 to 20 x2 weeks then to 10mg x2 weeks then discontinue  -start sertraline 25mg daily  -plan to follow up in 1-2 months regarding efficacy, adjust as indicated    Alzheimer's disease of other onset without behavioral disturbance  -cries/screams out at times when wanting attention or assistance  -no danger to self or others, roommate if very [place] and is not disturbed  -continue seroquel 12.5mg BID for now      Dysphagia, unspecified type  -followed by ST and dietary  -changed diet to NDD3, thin liquids per request    Other chronic pain  -patient denies having pain, per staff have trialed other pain modalities including tramadol and was not effective  -will continue norco 5-325mg tabs Q6h scheduled with no PRN for now, has been  effective        Electronically signed by:  EDMUND Luz CNP

## 2019-06-03 NOTE — LETTER
6/3/2019        RE: Xiomara Matson  [address] [facility] [address]        [place] [facility]  Nursing Home Regulatory Visit  Laurita 3, 2019    Chief Complaint   Patient presents with     assisted Regulatory       HPI:    Xiomara Matson is a 83 year old  (1935), who is being seen today for a federally mandated E/M visit at [facility]. Today's concerns are:    1) Deperession / Anxiety / Dementia With Behavioral Disturbance -- BIMS 3/15. Currently on a paroxetine taper (last day 6/14/19) and has been started on sertraline.  Is also on quetiapine. She has intermittent episodes of of crying out.  2) Paroxysmal Atrial Fibrillation -- HR 70s. Not on any rate control meds. Also not on anticoagulation as risk> benefit.   3) Chronic Pain Syndrome -- She has knee OA as well as some wounds. Managed with APAP, diclofenac gel and Norco     ALLERGIES: Ibuprofen sodium; Hmg-coa-r inhibitors; Sulfa drugs; and Oxycodone    PAST MEDICAL HISTORY:   Past Medical History:   Diagnosis Date     Adrenal mass, right (H) 3/23/2015     Alzheimer disease 8/2015     Atrial fibrillation, unspecified type (H) 4/3/2019     Cholelithiasis 3/23/2015     Female incontinence      Fibroid      Hypercholesterolaemia      Iritis      Major depression in complete remission (H)     Last visit to Psychiatrist in 2005     Tobacco use disorder 2/23/2015       PAST SURGICAL HISTORY:   Past Surgical History:   Procedure Laterality Date     DENTAL SURGERY      patient has dentures     DILATION AND CURETTAGE  1/2011    endometrial polyp seen     EXTRACAPSULAR CATARACT EXTRATION WITH INTRAOCULAR LENS IMPLANT Bilateral      KNEE SURGERY Left 12/31/2002     ROMAINE/LSO/TVT  11/2011    postmenopausal bleeding, urinary incontinence     TONSILLECTOMY         FAMILY HISTORY:   Family History   Problem Relation Age of Onset     Alzheimer Disease Mother      Breast Cancer Sister      Cancer Son 40        Lymphoma       SOCIAL HISTORY:  "  Lives in a SNF    MEDICATIONS:  Current Outpatient Medications   Medication Sig Dispense Refill     acetaminophen (TYLENOL) 500 MG tablet Take 1,000 mg by mouth every 12 hours as needed for mild pain        diclofenac (VOLTAREN) 1 % topical gel Place 4 g onto the skin 3 times daily Also BID PRN       guaiFENesin (ORGANIDIN) 200 MG tablet Take 400 mg by mouth every 4 hours as needed for cough       HYDROcodone-acetaminophen (NORCO) 5-325 MG tablet Take 1 tablet by mouth every 6 hours 3 tablet 0     nystatin (MYCOSTATIN) 352724 UNIT/GM external powder Apply topically 2 times daily Also TID PRN       QUEtiapine (SEROQUEL) 25 MG tablet Take 12.5 mg by mouth 2 times daily       senna-docusate (SENOKOT-S/PERICOLACE) 8.6-50 MG tablet Take 2 tablets by mouth At Bedtime       sertraline (ZOLOFT) 25 MG tablet Take 1 tablet (25 mg) by mouth daily 30 tablet 11     zinc gluconate 50 MG tablet Take 50 mg by mouth daily         Medications reviewed:  Medications reconciled to facility chart and changes were made to reflect current medications as identified as above med list. Below are the changes that were made:   Medications stopped since last EPIC medication reconciliation:   There are no discontinued medications.  Medications started since last [facility] medication reconciliation:  No orders of the defined types were placed in this encounter.      Case Management:  I have reviewed the care plan and MDS and do agree with the plan.   Information reviewed:  Medications, vital signs, orders, and nursing notes.    ROS:  Unable to be obtained due to cognitive impairment or aphasia.     PHYSICAL EXAM:  /70   Pulse 70   Temp 98.6  F (37  C)   Resp 18   Ht 1.626 m (5' 4\")   Wt 72.5 kg (159 lb 14.4 oz)   SpO2 96%   BMI 27.45 kg/m     Gen: sitting in wheelchair, alert, and in no acute distress  Resp: breathing non labored  GI: abdomen soft, not-tender  Ext: no LE edema  Neuro: CX II-XII grossly in tact; ROM in all four " extremities grossly in tact  Psych: memory, judgement and insight impaired    Lab/Diagnostic data:  Reviewed as per Epic    ASSESSMENT/PLAN    1) Deperession / Anxiety / Dementia With Behavioral Disturbance   BIMS 3/15. Currently on a paroxetine taper   -- continues on paroxetine 10 mg daily (last day 6/14/19)  -- continues on sertraline 25 mg daily and quetiapine 12.5 mg BID  -- ongoing 24/7 nursing and supportive cares    2) Paroxysmal Atrial Fibrillation   HR 70s. Not on any rate control meds. Also not on anticoagulation as risk> benefit.   -- follow clinically    3) Chronic Pain Syndrome   She has knee OA as well as some wounds.   -- continues on APAP 1000 mg q12h PRN, diclofenac gel 4g TID and BID PRN and Norco 5-325 mg 1 tab q6h     Electronically signed by:  Prudence Eduardo MD        Sincerely,        rPudence Eduardo MD

## 2019-06-03 NOTE — PROGRESS NOTES
[place] [facility]  Nursing Home Regulatory Visit  Laurita 3, 2019    Chief Complaint   Patient presents with     longterm Regulatory       HPI:    Xiomara Matson is a 83 year old  (1935), who is being seen today for a federally mandated E/M visit at [facility]. Today's concerns are:    1) Deperession / Anxiety / Dementia With Behavioral Disturbance -- BIMS 3/15. Currently on a paroxetine taper (last day 6/14/19) and has been started on sertraline.  Is also on quetiapine. She has intermittent episodes of of crying out.  2) Paroxysmal Atrial Fibrillation -- HR 70s. Not on any rate control meds. Also not on anticoagulation as risk> benefit.   3) Chronic Pain Syndrome -- She has knee OA as well as some wounds. Managed with APAP, diclofenac gel and Norco     ALLERGIES: Ibuprofen sodium; Hmg-coa-r inhibitors; Sulfa drugs; and Oxycodone    PAST MEDICAL HISTORY:   Past Medical History:   Diagnosis Date     Adrenal mass, right (H) 3/23/2015     Alzheimer disease 8/2015     Atrial fibrillation, unspecified type (H) 4/3/2019     Cholelithiasis 3/23/2015     Female incontinence      Fibroid      Hypercholesterolaemia      Iritis      Major depression in complete remission (H)     Last visit to Psychiatrist in 2005     Tobacco use disorder 2/23/2015       PAST SURGICAL HISTORY:   Past Surgical History:   Procedure Laterality Date     DENTAL SURGERY      patient has dentures     DILATION AND CURETTAGE  1/2011    endometrial polyp seen     EXTRACAPSULAR CATARACT EXTRATION WITH INTRAOCULAR LENS IMPLANT Bilateral      KNEE SURGERY Left 12/31/2002     ROMAINE/LSO/TVT  11/2011    postmenopausal bleeding, urinary incontinence     TONSILLECTOMY         FAMILY HISTORY:   Family History   Problem Relation Age of Onset     Alzheimer Disease Mother      Breast Cancer Sister      Cancer Son 40        Lymphoma       SOCIAL HISTORY:   Lives in a SNF    MEDICATIONS:  Current Outpatient Medications   Medication Sig Dispense  "Refill     acetaminophen (TYLENOL) 500 MG tablet Take 1,000 mg by mouth every 12 hours as needed for mild pain        diclofenac (VOLTAREN) 1 % topical gel Place 4 g onto the skin 3 times daily Also BID PRN       guaiFENesin (ORGANIDIN) 200 MG tablet Take 400 mg by mouth every 4 hours as needed for cough       HYDROcodone-acetaminophen (NORCO) 5-325 MG tablet Take 1 tablet by mouth every 6 hours 3 tablet 0     nystatin (MYCOSTATIN) 173081 UNIT/GM external powder Apply topically 2 times daily Also TID PRN       QUEtiapine (SEROQUEL) 25 MG tablet Take 12.5 mg by mouth 2 times daily       senna-docusate (SENOKOT-S/PERICOLACE) 8.6-50 MG tablet Take 2 tablets by mouth At Bedtime       sertraline (ZOLOFT) 25 MG tablet Take 1 tablet (25 mg) by mouth daily 30 tablet 11     zinc gluconate 50 MG tablet Take 50 mg by mouth daily         Medications reviewed:  Medications reconciled to facility chart and changes were made to reflect current medications as identified as above med list. Below are the changes that were made:   Medications stopped since last EPIC medication reconciliation:   There are no discontinued medications.  Medications started since last [facility] medication reconciliation:  No orders of the defined types were placed in this encounter.      Case Management:  I have reviewed the care plan and MDS and do agree with the plan.   Information reviewed:  Medications, vital signs, orders, and nursing notes.    ROS:  Unable to be obtained due to cognitive impairment or aphasia.     PHYSICAL EXAM:  /70   Pulse 70   Temp 98.6  F (37  C)   Resp 18   Ht 1.626 m (5' 4\")   Wt 72.5 kg (159 lb 14.4 oz)   SpO2 96%   BMI 27.45 kg/m    Gen: sitting in wheelchair, alert, and in no acute distress  Resp: breathing non labored  GI: abdomen soft, not-tender  Ext: no LE edema  Neuro: CX II-XII grossly in tact; ROM in all four extremities grossly in tact  Psych: memory, judgement and insight impaired    Lab/Diagnostic data:  " Reviewed as per Epic    ASSESSMENT/PLAN    1) Deperession / Anxiety / Dementia With Behavioral Disturbance   BIMS 3/15. Currently on a paroxetine taper   -- continues on paroxetine 10 mg daily (last day 6/14/19)  -- continues on sertraline 25 mg daily and quetiapine 12.5 mg BID  -- ongoing 24/7 nursing and supportive cares    2) Paroxysmal Atrial Fibrillation   HR 70s. Not on any rate control meds. Also not on anticoagulation as risk> benefit.   -- follow clinically    3) Chronic Pain Syndrome   She has knee OA as well as some wounds.   -- continues on APAP 1000 mg q12h PRN, diclofenac gel 4g TID and BID PRN and Norco 5-325 mg 1 tab q6h     Electronically signed by:  Prudence Eduardo MD

## 2019-08-29 NOTE — PROGRESS NOTES
[place] [facility]  Chief Complaint   Patient presents with     Annual Comprehensive Nursing Home     [place] Medical Record Number:  [id number]  Place of Service where encounter took place:  [facility] ([facility]) [[id number]]    HPI:    Xiomara Matson  is a 83 year old  (1935), who is being seen today for an annual comprehensive visit. HPI information obtained from: facility chart records, facility staff and [facility] chart review.  Today's concerns are:     Current moderate episode of major depressive disorder, unspecified whether recurrent (H)  Anxiety disorder, unspecified type  Benign essential hypertension  Hospice care patient  Xiomara was seen today in her room, lying in bed. Limited and inappropriate verbal responses. Patient became tearful and started crying out during exam. Staff report the change to scheduled pain medications has greatly helped with her discomfort. Followed by [facility].        ALLERGIES: Ibuprofen sodium; Hmg-coa-r inhibitors; Sulfa drugs; and Oxycodone  PAST MEDICAL HISTORY:  has a past medical history of Adrenal mass, right (H) (3/23/2015), Alzheimer disease (8/2015), Atrial fibrillation, unspecified type (H) (4/3/2019), Cholelithiasis (3/23/2015), Female incontinence, Fibroid, Hypercholesterolaemia, Iritis, Major depression in complete remission (H), and Tobacco use disorder (2/23/2015).  PAST SURGICAL HISTORY:  has a past surgical history that includes knee surgery (Left, 12/31/2002); Extracapsular cataract extration with intraocular lens implant (Bilateral); Dilation and curettage (1/2011); Tonsillectomy; Dental surgery; and ROMAINE/LSO/TVT (11/2011).  IMMUNIZATIONS:  Immunization History   Administered Date(s) Administered     Influenza (IIV3) PF 10/01/2011     Pneumo Conj 13-V (2010&after) 02/23/2015     Pneumococcal 23 valent 01/02/2002, 10/01/2011     TD (ADULT, 7+) 01/31/2008     Above immunizations pulled from [facility]. MIIC and facility  records also reconciled. Outstanding information sent to  to update [facility].  Future immunizations needed:  yearly influenza per facility protocol    Current Outpatient Medications   Medication Sig Dispense Refill     acetaminophen (TYLENOL) 500 MG tablet Take 1,000 mg by mouth every 12 hours as needed for mild pain        diclofenac (VOLTAREN) 1 % topical gel Place 4 g onto the skin 3 times daily Also BID PRN       guaiFENesin (ORGANIDIN) 200 MG tablet Take 400 mg by mouth every 4 hours as needed for cough       HYDROcodone-acetaminophen (NORCO) 5-325 MG tablet Take 1 tablet by mouth every 2 hours as needed for severe pain       HYDROcodone-acetaminophen (NORCO) 5-325 MG tablet Take 1 tablet by mouth every 6 hours 3 tablet 0     nystatin (MYCOSTATIN) 001958 UNIT/GM external powder Apply topically 2 times daily Also TID PRN       QUEtiapine (SEROQUEL) 25 MG tablet Take 25 mg by mouth 2 times daily        senna-docusate (SENOKOT-S/PERICOLACE) 8.6-50 MG tablet Take 2 tablets by mouth At Bedtime       sertraline (ZOLOFT) 25 MG tablet Take 2 tablets (50 mg) by mouth daily 30 tablet 11     zinc gluconate 50 MG tablet Take 50 mg by mouth daily         Case Management:  I have reviewed the facility/SNF care plan/MDS, including the falls risk, nutrition and pain screening. I also reviewed the current immunizations, and preventive care. .Future cancer screening is not clinically indicated secondary to age/goals of care Patient's desire to return to the community is not assessible due to cognitive impairment. Current Level of Care is appropriate.    Advance Directive Discussion:    I reviewed the current advanced directives as reflected in EPIC, the POLST and the facility chart, and verified the congruency of orders.  I did not due to cognitive impairment review the advance directives with the resident.     Team Discussion:  I communicated with the appropriate disciplines involved with the Plan of Care:    Nursing    Patient's goal is pain control and comfort.  Information reviewed:  Medications, vital signs, orders, and nursing notes.    ROS:  Unobtainable secondary to cognitive impairment.     Vitals:  /66   Pulse 76   Temp 97.8  F (36.6  C)   Resp 17   Wt 72.8 kg (160 lb 9.6 oz)   SpO2 95%   BMI 27.57 kg/m   Body mass index is 27.57 kg/m .  Exam:  GENERAL APPEARANCE:  anxious, became upset during exam, tearful and crying out.   ENT:  [place], Mouth and posterior oropharynx normal, moist mucous membranes  RESP:  respiratory effort and palpation of chest normal, lungs clear to auscultation , no respiratory distress  CV:  Palpation and auscultation of heart done , regular rate and rhythm, no murmur, rub, or gallop, +2 pedal pulses  ABDOMEN:  normal bowel sounds, soft, nontender, no hepatosplenomegaly or other masses  M/S:   Gait and station abnormal, uses wheelchair  SKIN:  wound healing well, no signs of infection to wound on right medial transverse tarsal joint. She is currently wearing foot and heel protectors. Otherwise skin is pink, warm and without areas of concern.   NEURO:   Examination of sensation by touch normal  PSYCH:  insight and judgement impaired,  sad, crying     Lab/Diagnostic data:   [facility] labs in [facility] reviewed by me today.     ASSESSMENT/PLAN  Current moderate episode of major depressive disorder, unspecified whether recurrent (H)  Per staff Xiomara has been more confused and becomes upset easily. Xiomara started crying and calling out during her physical exam.   -increase her sertraline to 50 mg daily, will increase dose as needed.     Anxiety disorder, unspecified type  Xiomara became upset during exam.  Hospice recently increased Seroquel from 12.5 mg tid to 25 mg tid. Sertraline dose increased today, hopefully these changes will positively impact her depression and anxiety.     Benign essential hypertension  HTN currently stable BP today 106/66 and Pulse 76. Xiomara is not currently on medication  for her blood pressure. Nursing staff will continue to monitor blood pressure.     Hospice care patient  Followed by [facility] team, appreciate their support  -medications reviewed as appropriate, no change in hospice plan today    Electronically signed by:  EDMUND Luz CNP

## 2019-08-30 PROBLEM — Z51.5 HOSPICE CARE PATIENT: Status: ACTIVE | Noted: 2019-01-01

## 2019-10-07 NOTE — PROGRESS NOTES
[facility]  Nursing Home Regulatory Visit  October 7, 2019    Chief Complaint   Patient presents with     skilled nursing Regulatory         HPI:    Xiomara Matson is a 83 year old  (1935), who is being seen today for a federally mandated E/M visit at . Today's concerns are:    1) Depression and Anxiety -- Episodes of crying out. Recent nursing note where she had a visit from a therapy dog and that seemed to brighten her mood. She remains on quetiapine and sertraline.  2) Dementia With Behavioral Disturbance -- BIMS 3/15. She is enrolled with hospice. Appears comfortable today.   3) Chronic Pain Syndrome -- She appears comfortable today. Managed with APAP, diclofenac gel and Norco     ALLERGIES: Ibuprofen sodium; Hmg-coa-r inhibitors; Questran [cholestyramine]; Sulfa drugs; and Oxycodone    PAST MEDICAL HISTORY:   Past Medical History:   Diagnosis Date     Adrenal mass, right (H) 3/23/2015     Alzheimer disease 8/2015     Atrial fibrillation, unspecified type (H) 4/3/2019     Cholelithiasis 3/23/2015     Female incontinence      Fibroid      Hypercholesterolaemia      Iritis      Major depression in complete remission (H)     Last visit to Psychiatrist in 2005     Tobacco use disorder 2/23/2015       PAST SURGICAL HISTORY:   Past Surgical History:   Procedure Laterality Date     DENTAL SURGERY      patient has dentures     DILATION AND CURETTAGE  1/2011    endometrial polyp seen     EXTRACAPSULAR CATARACT EXTRATION WITH INTRAOCULAR LENS IMPLANT Bilateral      KNEE SURGERY Left 12/31/2002     ROMAINE/LSO/TVT  11/2011    postmenopausal bleeding, urinary incontinence     TONSILLECTOMY         FAMILY HISTORY:   Family History   Problem Relation Age of Onset     Alzheimer Disease Mother      Breast Cancer Sister      Cancer Son 40        Lymphoma       SOCIAL HISTORY:   Lives in a SNF    MEDICATIONS:  Current Outpatient Medications   Medication Sig Dispense Refill     acetaminophen  (TYLENOL) 325 MG tablet Take 325 mg by mouth 2 times daily as needed for mild pain       guaiFENesin (ORGANIDIN) 200 MG tablet Take 400 mg by mouth every 4 hours as needed for cough       HYDROcodone-acetaminophen (NORCO) 5-325 MG tablet Take 1 tablet by mouth every 6 hours as needed for severe pain        HYDROcodone-acetaminophen (NORCO) 5-325 MG tablet Take 1 tablet by mouth every 6 hours (Patient taking differently: Take 1 tablet by mouth every 4 hours ) 3 tablet 0     nystatin (MYCOSTATIN) 384186 UNIT/GM external powder Apply topically 2 times daily Also TID PRN       QUEtiapine (SEROQUEL) 25 MG tablet Take 25 mg by mouth 3 times daily        senna-docusate (SENOKOT-S/PERICOLACE) 8.6-50 MG tablet Take 2 tablets by mouth At Bedtime       sertraline (ZOLOFT) 25 MG tablet Take 2 tablets (50 mg) by mouth daily 30 tablet 11     zinc gluconate 50 MG tablet Take 50 mg by mouth daily       diclofenac (VOLTAREN) 1 % topical gel Place 4 g onto the skin 3 times daily Also BID PRN         Medications reviewed:  Medications reconciled to facility chart and changes were made to reflect current medications as identified as above med list. Below are the changes that were made:   Medications stopped since last EPIC medication reconciliation:   Medications Discontinued During This Encounter   Medication Reason     acetaminophen (TYLENOL) 500 MG tablet      Medications started since last [facility] medication reconciliation:  Orders Placed This Encounter   Medications     acetaminophen (TYLENOL) 325 MG tablet     Sig: Take 325 mg by mouth 2 times daily as needed for mild pain       Case Management:  I have reviewed the care plan and MDS and do agree with the plan.   Information reviewed:  Medications, vital signs, orders, and nursing notes.    The health plan new enrollment has happened. I have reviewed the  MDS, the preventative needs,  and facility care plan. The level of care is appropriate. I have reviewed the code status/advanced  "directives.     ROS:  Unable to be obtained due to cognitive impairment or aphasia.     PHYSICAL EXAM:  /71   Pulse 69   Temp 97.5  F (36.4  C)   Resp 18   Ht 1.626 m (5' 4\")   Wt 70.6 kg (155 lb 9.6 oz)   SpO2 96%   BMI 26.71 kg/m    Gen: laying in bed in no acute distress  Resp: breathing non labpred  Ext: no LE edema  Neuro: CX II-XII grossly in tact; ROM in upper extremities grossly in tact  Psych: memory, judgement and insight impaired    Lab/Diagnostic data:  Reviewed as per Epic    ASSESSMENT/PLAN    1) Depression and Anxiety   Episodes of crying out. Recent nursing note where she had a visit from a therapy dog and that seemed to brighten her mood.   -- quetiapine 25 mg TID and sertraline 50 mg daily  -- ongoing supportive cares; appreciate the cares/support of hospice team    2) Dementia With Behavioral Disturbance   BIMS 3/15. She is enrolled with hospice. Appears comfortable today.   -- ongoing 24/7 nursing and supportive cares  -- appreciate cares of [facility] hospice team    3) Chronic Pain Syndrome   She appears comfortable today.  -- continues on APAP 325 mg BID PRN, diclofenac gel TID and BID PRN and Norco 5-325 mg 1 tab q4h scheduled and q6h PRN    Electronically signed by:  Prudence Eduardo MD    "

## 2019-10-07 NOTE — LETTER
10/7/2019        RE: Xiomara Matson  [facility]  [address]  [facility] [address]        [place] [facility]  Nursing Home Regulatory Visit  October 7, 2019    Chief Complaint   Patient presents with     senior living Regulatory         HPI:    Xiomara Matson is a 83 year old  (1935), who is being seen today for a federally mandated E/M visit at [facility]. Today's concerns are:    1) Depression and Anxiety -- Episodes of crying out. Recent nursing note where she had a visit from a therapy dog and that seemed to brighten her mood. She remains on quetiapine and sertraline.  2) Dementia With Behavioral Disturbance -- BIMS 3/15. She is enrolled with hospice. Appears comfortable today.   3) Chronic Pain Syndrome -- She appears comfortable today. Managed with APAP, diclofenac gel and Norco     ALLERGIES: Ibuprofen sodium; Hmg-coa-r inhibitors; Questran [cholestyramine]; Sulfa drugs; and Oxycodone    PAST MEDICAL HISTORY:   Past Medical History:   Diagnosis Date     Adrenal mass, right (H) 3/23/2015     Alzheimer disease 8/2015     Atrial fibrillation, unspecified type (H) 4/3/2019     Cholelithiasis 3/23/2015     Female incontinence      Fibroid      Hypercholesterolaemia      Iritis      Major depression in complete remission (H)     Last visit to Psychiatrist in 2005     Tobacco use disorder 2/23/2015       PAST SURGICAL HISTORY:   Past Surgical History:   Procedure Laterality Date     DENTAL SURGERY      patient has dentures     DILATION AND CURETTAGE  1/2011    endometrial polyp seen     EXTRACAPSULAR CATARACT EXTRATION WITH INTRAOCULAR LENS IMPLANT Bilateral      KNEE SURGERY Left 12/31/2002     ROMAINE/LSO/TVT  11/2011    postmenopausal bleeding, urinary incontinence     TONSILLECTOMY         FAMILY HISTORY:   Family History   Problem Relation Age of Onset     Alzheimer Disease Mother      Breast Cancer Sister      Cancer Son 40        Lymphoma       SOCIAL  HISTORY:   Lives in a SNF    MEDICATIONS:  Current Outpatient Medications   Medication Sig Dispense Refill     acetaminophen (TYLENOL) 325 MG tablet Take 325 mg by mouth 2 times daily as needed for mild pain       guaiFENesin (ORGANIDIN) 200 MG tablet Take 400 mg by mouth every 4 hours as needed for cough       HYDROcodone-acetaminophen (NORCO) 5-325 MG tablet Take 1 tablet by mouth every 6 hours as needed for severe pain        HYDROcodone-acetaminophen (NORCO) 5-325 MG tablet Take 1 tablet by mouth every 6 hours (Patient taking differently: Take 1 tablet by mouth every 4 hours ) 3 tablet 0     nystatin (MYCOSTATIN) 036036 UNIT/GM external powder Apply topically 2 times daily Also TID PRN       QUEtiapine (SEROQUEL) 25 MG tablet Take 25 mg by mouth 3 times daily        senna-docusate (SENOKOT-S/PERICOLACE) 8.6-50 MG tablet Take 2 tablets by mouth At Bedtime       sertraline (ZOLOFT) 25 MG tablet Take 2 tablets (50 mg) by mouth daily 30 tablet 11     zinc gluconate 50 MG tablet Take 50 mg by mouth daily       diclofenac (VOLTAREN) 1 % topical gel Place 4 g onto the skin 3 times daily Also BID PRN         Medications reviewed:  Medications reconciled to facility chart and changes were made to reflect current medications as identified as above med list. Below are the changes that were made:   Medications stopped since last EPIC medication reconciliation:   Medications Discontinued During This Encounter   Medication Reason     acetaminophen (TYLENOL) 500 MG tablet      Medications started since last [facility] medication reconciliation:  Orders Placed This Encounter   Medications     acetaminophen (TYLENOL) 325 MG tablet     Sig: Take 325 mg by mouth 2 times daily as needed for mild pain       Case Management:  I have reviewed the care plan and MDS and do agree with the plan.   Information reviewed:  Medications, vital signs, orders, and nursing notes.    The health plan new enrollment has happened. I have reviewed the  MDS,  "the preventative needs,  and facility care plan. The level of care is appropriate. I have reviewed the code status/advanced directives.     ROS:  Unable to be obtained due to cognitive impairment or aphasia.     PHYSICAL EXAM:  /71   Pulse 69   Temp 97.5  F (36.4  C)   Resp 18   Ht 1.626 m (5' 4\")   Wt 70.6 kg (155 lb 9.6 oz)   SpO2 96%   BMI 26.71 kg/m     Gen: laying in bed in no acute distress  Resp: breathing non labpred  Ext: no LE edema  Neuro: CX II-XII grossly in tact; ROM in upper extremities grossly in tact  Psych: memory, judgement and insight impaired    Lab/Diagnostic data:  Reviewed as per Epic    ASSESSMENT/PLAN    1) Depression and Anxiety   Episodes of crying out. Recent nursing note where she had a visit from a therapy dog and that seemed to brighten her mood.   -- quetiapine 25 mg TID and sertraline 50 mg daily  -- ongoing supportive cares; appreciate the cares/support of hospice team    2) Dementia With Behavioral Disturbance   BIMS 3/15. She is enrolled with hospice. Appears comfortable today.   -- ongoing 24/7 nursing and supportive cares  -- appreciate cares of [facility] hospice team    3) Chronic Pain Syndrome   She appears comfortable today.  -- continues on APAP 325 mg BID PRN, diclofenac gel TID and BID PRN and Norco 5-325 mg 1 tab q4h scheduled and q6h PRN    Electronically signed by:  Prudence Eduardo MD        Sincerely,        Prudence Eduardo MD    "